# Patient Record
Sex: MALE | Race: BLACK OR AFRICAN AMERICAN | NOT HISPANIC OR LATINO | Employment: UNEMPLOYED | URBAN - METROPOLITAN AREA
[De-identification: names, ages, dates, MRNs, and addresses within clinical notes are randomized per-mention and may not be internally consistent; named-entity substitution may affect disease eponyms.]

---

## 2017-07-27 ENCOUNTER — HOSPITAL ENCOUNTER (EMERGENCY)
Facility: OTHER | Age: 27
Discharge: HOME | End: 2017-07-27
Attending: EMERGENCY MEDICINE

## 2017-07-27 VITALS
OXYGEN SATURATION: 99 % | HEIGHT: 69 IN | RESPIRATION RATE: 16 BRPM | DIASTOLIC BLOOD PRESSURE: 56 MMHG | WEIGHT: 130 LBS | SYSTOLIC BLOOD PRESSURE: 128 MMHG | TEMPERATURE: 99 F | BODY MASS INDEX: 19.26 KG/M2 | HEART RATE: 79 BPM

## 2017-07-27 DIAGNOSIS — M79.672 FOOT PAIN, BILATERAL: Primary | ICD-10-CM

## 2017-07-27 DIAGNOSIS — M79.671 FOOT PAIN, BILATERAL: Primary | ICD-10-CM

## 2017-07-27 DIAGNOSIS — M25.572 ACUTE BILATERAL ANKLE PAIN: ICD-10-CM

## 2017-07-27 DIAGNOSIS — M25.571 ACUTE BILATERAL ANKLE PAIN: ICD-10-CM

## 2017-07-27 PROCEDURE — 25000003 PHARM REV CODE 250: Performed by: PHYSICIAN ASSISTANT

## 2017-07-27 PROCEDURE — 99283 EMERGENCY DEPT VISIT LOW MDM: CPT

## 2017-07-27 RX ORDER — IBUPROFEN 800 MG/1
800 TABLET ORAL 3 TIMES DAILY
Qty: 21 TABLET | Refills: 0 | Status: SHIPPED | OUTPATIENT
Start: 2017-07-27 | End: 2017-08-03

## 2017-07-27 RX ORDER — IBUPROFEN 400 MG/1
800 TABLET ORAL
Status: COMPLETED | OUTPATIENT
Start: 2017-07-27 | End: 2017-07-27

## 2017-07-27 RX ADMIN — IBUPROFEN 800 MG: 400 TABLET, FILM COATED ORAL at 08:07

## 2017-07-28 NOTE — ED PROVIDER NOTES
Encounter Date: 7/27/2017       History     Chief Complaint   Patient presents with    Ankle Pain     pt c/o bilateral ankle pain and swelling to the R ankle after playing basketball a week ago     Patient is a 26-year-old male presenting to the emergency department with complaints of bilateral ankle and foot pain.  The patient reports his symptoms 7 persistent for over 2 weeks.  He states the pain initially started after he played basketball, and states that his been constant since.  He admits that they've worsened after he tried to play basketball again.  He denies any numbness, tingling, weakness.  He states that he has significant pain from the ankle into the feet.  He reports subjective swelling.  He states that he has used Epson soaks but has not taken any oral medication.  He denies previous injury or trauma.  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      The history is provided by the patient.     Review of patient's allergies indicates:  No Known Allergies  History reviewed. No pertinent past medical history.  History reviewed. No pertinent surgical history.  History reviewed. No pertinent family history.  Social History   Substance Use Topics    Smoking status: Light Tobacco Smoker    Smokeless tobacco: Never Used    Alcohol use Not on file     Review of Systems   Constitutional: Negative for activity change, chills, fatigue and fever.   HENT: Negative for congestion, rhinorrhea and sore throat.    Eyes: Negative for photophobia and visual disturbance.   Respiratory: Negative for cough and shortness of breath.    Cardiovascular: Negative for chest pain.   Gastrointestinal: Negative for abdominal pain, diarrhea, nausea and vomiting.   Genitourinary: Negative for dysuria, hematuria and urgency.   Musculoskeletal: Positive for arthralgias and myalgias. Negative for back pain and neck pain.   Skin: Negative for color change and wound.   Neurological: Negative  for weakness and headaches.   Psychiatric/Behavioral: Negative for agitation and confusion.       Physical Exam     Initial Vitals [07/27/17 1950]   BP Pulse Resp Temp SpO2   (!) 128/56 79 16 98.5 °F (36.9 °C) 99 %      MAP       80         Physical Exam    Nursing note and vitals reviewed.  Constitutional: Vital signs are normal. He appears well-developed and well-nourished. He is not diaphoretic.  Non-toxic appearance. He does not have a sickly appearance. No distress.   Well appearing, -American male accompanied by his girlfriend in the emergency department.  He speaking clear and full sentences.  He is in no acute distress.  He and rates that difficulty.   HENT:   Head: Normocephalic and atraumatic.   Right Ear: External ear normal.   Left Ear: External ear normal.   Nose: Nose normal.   Mouth/Throat: Oropharynx is clear and moist.   Eyes: Conjunctivae and EOM are normal.   Neck: Normal range of motion. Neck supple.   Cardiovascular: Normal rate, regular rhythm and normal heart sounds.   Pulmonary/Chest: Breath sounds normal. No respiratory distress. He has no wheezes.   Musculoskeletal: Normal range of motion.   Tenderness to palpation of the bilateral lower extremities at the bilateral malleolus, dorsum of the foot.  No edema noted.  Normal range of motion, strength, sensation, capillary refill, pulses.  No overlying skin changes.  Ambulatory and weightbearing.   Neurological: He is alert and oriented to person, place, and time. He has normal strength. No cranial nerve deficit or sensory deficit. GCS eye subscore is 4. GCS verbal subscore is 5. GCS motor subscore is 6.   Skin: Skin is warm.   Psychiatric: He has a normal mood and affect. His behavior is normal. Judgment and thought content normal.         ED Course   Procedures  Labs Reviewed - No data to display          Medical Decision Making:   Initial Assessment:   Urgent evaluation of a 26 y.o. male presenting to the emergency department  complaining of bilateral foot and ankle pain. Patient is afebrile, nontoxic appearing and hemodynamically stable. Physical exam reveals regular rate and rhythm, lungs are clear to auscultation bilaterally.  Diffuse tenderness to palpation of bilateral lower extremities with no focal deformity, crepitus, step-off.  Normal range of motion, strength, sensation.    ED Management:  Without specific history of recent injury or trauma, do not feel that x-rays are warranted.  Patient's signs and symptoms are not consistent with an acute fracture dislocation.  He is ambulatory and weightbearing.  I do believe his signs and symptoms are likely secondary to musculoskeletal etiology.  He'll be discharged home with a prescription for ibuprofen, counseled on symptomatic care and treatment.  He stable for discharge. The patient was instructed to follow up with a primary care provider in 2 days or to return to the emergency department for worsening symptoms. The treatment plan was discussed with the patient who demonstrated understanding and comfort with plan. The patient's history, physical exam, and plan of care was discussed with and agreed upon with my supervising physician.    Other:   I have discussed this case with another health care provider.       <> Summary of the Discussion: Dr. Howard  This note was created using Dragon Medical Dictation. There may be typographical errors secondary to dictation.                    ED Course     Clinical Impression:     1. Foot pain, bilateral    2. Acute bilateral ankle pain         Disposition:   Disposition: Discharged  Condition: Stable                        Uma Jarrett PA-C  07/27/17 4287

## 2022-01-26 ENCOUNTER — HOSPITAL ENCOUNTER (EMERGENCY)
Facility: HOSPITAL | Age: 32
Discharge: HOME | End: 2022-01-26
Attending: EMERGENCY MEDICINE
Payer: MEDICAID

## 2022-01-26 VITALS
DIASTOLIC BLOOD PRESSURE: 60 MMHG | RESPIRATION RATE: 18 BRPM | HEIGHT: 69 IN | SYSTOLIC BLOOD PRESSURE: 102 MMHG | HEART RATE: 92 BPM | OXYGEN SATURATION: 100 % | TEMPERATURE: 99 F | WEIGHT: 119.69 LBS | BODY MASS INDEX: 17.73 KG/M2

## 2022-01-26 DIAGNOSIS — F41.9 ANXIETY: Primary | ICD-10-CM

## 2022-01-26 PROCEDURE — 99283 EMERGENCY DEPT VISIT LOW MDM: CPT

## 2022-01-26 RX ORDER — HYDROXYZINE PAMOATE 25 MG/1
25 CAPSULE ORAL EVERY 8 HOURS PRN
Qty: 20 CAPSULE | Refills: 0 | Status: SHIPPED | OUTPATIENT
Start: 2022-01-26

## 2022-01-26 NOTE — ED PROVIDER NOTES
History      Chief Complaint   Patient presents with    Depression    Anxiety     No previous treatment; Personal problems       Review of patient's allergies indicates:  No Known Allergies     HPI   HPI    1/26/2022, 2:06 PM   History obtained from the patient      History of Present Illness: Denise Moreno is a 31 y.o. male patient who presents to the Emergency Department for anxiety and depression with difficulty sleeping for months.  He has a psychiatric appt 2/7. Denies SI/HI/AVH. Symptoms are moderate in severity.     No further complaints or concerns at this time.           PCP: Primary Doctor No       Past Medical History:  History reviewed. No pertinent past medical history.      Past Surgical History:  History reviewed. No pertinent surgical history.        Family History:  No family history on file.        Social History:  Social History     Tobacco Use    Smoking status: Light Tobacco Smoker     Types: Cigarettes    Smokeless tobacco: Never Used   Substance and Sexual Activity    Alcohol use: Yes    Drug use: No    Sexual activity: Yes     Partners: Female     Birth control/protection: None       ROS     Review of Systems   Constitutional: Negative for chills and fever.   HENT: Negative for facial swelling and trouble swallowing.    Eyes: Negative for pain and discharge.   Respiratory: Negative for chest tightness and shortness of breath.    Cardiovascular: Negative for palpitations and leg swelling.   Gastrointestinal: Negative for diarrhea and vomiting.   Endocrine: Negative for polydipsia and polyuria.   Genitourinary: Negative for decreased urine volume and flank pain.   Musculoskeletal: Negative for joint swelling and neck stiffness.   Skin: Negative for rash and wound.   Neurological: Negative for syncope and light-headedness.   Psychiatric/Behavioral: Positive for dysphoric mood and sleep disturbance. Negative for hallucinations, self-injury and suicidal ideas. The patient is  "nervous/anxious.    All other systems reviewed and are negative.      Physical Exam      Initial Vitals [01/26/22 1351]   BP Pulse Resp Temp SpO2   102/60 92 18 98.5 °F (36.9 °C) 100 %      MAP       --         Physical Exam  Vital signs and nursing notes reviewed.  Constitutional: Patient is in NAD. Awake and alert. Well-developed and well-nourished.  Head: Atraumatic. Normocephalic.  Eyes: PERRL. EOM intact. Conjunctivae nl. No scleral icterus.  ENT: Mucous membranes are moist. Oropharynx is clear.  Neck: Supple. No JVD. No lymphadenopathy.  No meningismus  Cardiovascular: Regular rate and rhythm. No murmurs, rubs, or gallops. Distal pulses are 2+ and symmetric.  Pulmonary/Chest: No respiratory distress. Clear to auscultation bilaterally. No wheezing, rales, or rhonchi.  Abdominal: Soft. Non-distended. No TTP. No rebound, guarding, or rigidity. Good bowel sounds.  Genitourinary: No CVA tenderness  Musculoskeletal: Moves all extremities. No edema.   Skin: Warm and dry.  Neurological: Awake and alert. No acute focal neurological deficits are appreciated.  Psychiatric: Normal affect. Good eye contact. Appropriate in content.      ED Course          Procedures  ED Vital Signs:  Vitals:    01/26/22 1351   BP: 102/60   Pulse: 92   Resp: 18   Temp: 98.5 °F (36.9 °C)   TempSrc: Oral   SpO2: 100%   Weight: 54.3 kg (119 lb 11.4 oz)   Height: 5' 9" (1.753 m)                 Imaging Results:  Imaging Results    None            The Emergency Provider reviewed the vital signs and test results, which are outlined above.    ED Discussion             Medication(s) given in the ER:  Medications - No data to display         Follow-up Information     [facility] In 2 days.    Contact information:  [address] [address] [phone]                                Medication List      START taking these medications    hydrOXYzine pamoate 25 MG Cap  Commonly known as: VISTARIL  Take 1 capsule (25 mg " total) by mouth every 8 (eight) hours as needed (anxiety).           Where to Get Your Medications      These medications were sent to [id number] - [place], [place] - [address] [facility]  [address] [address]    Phone: [phone]   · hydrOXYzine pamoate 25 MG Cap             Medical Decision Making        All findings were reviewed with the patient/family in detail.   All remaining questions and concerns were addressed at that time.  Patient/family has been counseled regarding the need for follow-up as well as the indication to return to the emergency room should new or worrisome developments occur.        MDM               Clinical Impression:        ICD-10-CM ICD-9-CM   1. Anxiety  F41.9 300.00               Christina Campos PA-C  01/26/22 1408

## 2022-05-23 ENCOUNTER — HOSPITAL ENCOUNTER (EMERGENCY)
Facility: HOSPITAL | Age: 32
Discharge: HOME | End: 2022-05-23
Attending: EMERGENCY MEDICINE
Payer: MEDICAID

## 2022-05-23 VITALS
WEIGHT: 130 LBS | RESPIRATION RATE: 16 BRPM | TEMPERATURE: 98 F | OXYGEN SATURATION: 100 % | DIASTOLIC BLOOD PRESSURE: 60 MMHG | SYSTOLIC BLOOD PRESSURE: 122 MMHG | HEART RATE: 70 BPM | BODY MASS INDEX: 19.2 KG/M2

## 2022-05-23 DIAGNOSIS — F22 PARANOIA: Primary | ICD-10-CM

## 2022-05-23 PROCEDURE — 99283 EMERGENCY DEPT VISIT LOW MDM: CPT

## 2022-05-23 PROCEDURE — 99284 EMERGENCY DEPT VISIT MOD MDM: CPT | Mod: ,,, | Performed by: EMERGENCY MEDICINE

## 2022-05-23 PROCEDURE — 99284 PR EMERGENCY DEPT VISIT,LEVEL IV: ICD-10-PCS | Mod: ,,, | Performed by: EMERGENCY MEDICINE

## 2022-05-23 NOTE — DISCHARGE INSTRUCTIONS
[facility] Outpatient Clinics  - [facility]: [address] [place], [phone]  - [facility]: [address] [place], [phone]  - [facility]: [address] [place], [phone]  - [facility]: [address] [place], [phone]  - [facility] ([facility]): [address], [phone]  - [facility] ([facility]): [address] [facility] [address] [place], [phone]  - [facility]): [phone]  - [facility] [phone]     [facility] Private Outpatient Clinics  - [facility]: [address] 4th floor, [phone]  - [facility]: [address] [place], [phone]  - [facility]: [address], [phone], [phone]  - [facility]: [address], [phone]      Crisis  - [facility] Hotline, [phone]  - National Suicide Prevention Crisis Hotline: 306.570.1256

## 2022-05-23 NOTE — ED NOTES
Patient identifiers verified and correct for Mr Moreno  C/C: Bizarre behavior SEE NN  APPEARANCE: awake and alert in NAD.  SKIN: warm, dry and intact. No breakdown or bruising.  MUSCULOSKELETAL: Patient moving all extremities spontaneously, no obvious swelling or deformities noted. Ambulates independently.  RESPIRATORY: Denies shortness of breath.Respirations unlabored.   CARDIAC: Denies CP, 2+ distal pulses; no peripheral edema  ABDOMEN: S/ND/NT, Denies nausea  : voids spontaneously, denies difficulty  Neurologic: AAO x 4; follows commands equal strength in all extremities; denies numbness/tingling. Denies dizziness  Denies weakness,

## 2022-05-23 NOTE — ED NOTES
"Patient states " delusions" and " insecurities" Family stateshe hears things that are not happening, has " trust issues" and accusing GF of being unfaithful. Reports hearing sounds on the phone 2 months ago, patient states he needs to " maintain my thoughts"  Denies recent Vistaril use, Denies suicidal or homicidal ideations  "

## 2022-05-23 NOTE — ED PROVIDER NOTES
"Source of History:  pt and girlfriend    Chief complaint:  Anxiety (States has had a lot of anxiety and "insecurities", was living in  and has a therapist out there but is now living in  and wants to get established out here and started back on anxiety meds pt denies SI and HI )      HPI:  Denise Moreno is a 31 y.o. male presenting with delusions and paranoia.  His girlfriend states that he is regularly concerned that she is cheating on him are otherwise trying to trick and manipulate him.  She notes examples of when she was on the phone with him and he thinks that she is performing sex acts on other people over the phone, or when she is talking with a  at their [employer] and he thinks that they are somehow communicating behind his back.  They both note that he had this problem with the previous girlfriend as well.  His girlfriend says that he is concerned that other people are trying to steal from him or that other people want things of his.  He denies feeling like anyone is out to get him or trying to harm him.  He denies feeling like it is better different than other people, denies wanting to harm anyone because of his paranoia, denies wanting to hurt himself.  He denies hearing voices or seeing things.  He does not feel depressed.  He used to see therapist, but this was in [place], and since he has moved to [place] he has not seen anyone.  His last visit was several months ago.  No history of any mental health disorders or mental health hospitalizations.    ROS: As per HPI and below:    General: No fever.  No chills.  Eyes: No visual changes.  Head: No headache.    Integument: No rashes or lesions.  Chest: No shortness of breath.  Cardiovascular: No chest pain.  Abdomen: No abdominal pain.  No nausea or vomiting.  Urinary: No abnormal urination.  Neurologic: No focal weakness.  No numbness.  Hematologic: No easy bruising.  Endocrine: No excessive thirst or urination.      Review of patient's " allergies indicates:  No Known Allergies    No current facility-administered medications on file prior to encounter.     Current Outpatient Medications on File Prior to Encounter   Medication Sig Dispense Refill    hydrOXYzine pamoate (VISTARIL) 25 MG Cap Take 1 capsule (25 mg total) by mouth every 8 (eight) hours as needed (anxiety). 20 capsule 0       PMH:  As per HPI and below:  History reviewed. No pertinent past medical history.  History reviewed. No pertinent surgical history.    Social History     Socioeconomic History    Marital status: Single   Tobacco Use    Smoking status: Light Tobacco Smoker    Smokeless tobacco: Never Used   Substance and Sexual Activity    Alcohol use: Yes     Comment: 2 beers daily    Drug use: No    Sexual activity: Yes     Partners: Female     Birth control/protection: None       History reviewed. No pertinent family history.    Physical Exam:    Vitals:    05/23/22 1233   BP: 122/60   Pulse: 70   Resp: 16   Temp: 98.4 °F (36.9 °C)     Appearance: No acute distress.  Skin: No rashes seen.  Good turgor.  No abrasions.  No ecchymoses.  Eyes: No conjunctival injection. EOMI, PERRL.  ENT: Oropharynx clear.    Chest:  No increased work of breathing, bilateral chest rise.  Cardiovascular: Regular rate and rhythm.  Normal equal bilateral radial pulses.  Abdomen: Soft.  Not distended.  Nontender.  No guarding.  No rebound. No Masses  Musculoskeletal: Good range of motion all joints.  No deformities.  Neck supple, full range of motion, no obvious deformity.  Neurologic: Moves all extremities.  Normal sensation.  No facial droop.  Normal speech.    Mental Status:  Alert and oriented x 3.  Appropriate, conversant.          Laboratory Studies:  Labs Reviewed - No data to display    I decided to obtain the old medical records.  Reviewed and summarized the old medical record and it showed patient previously taking hydroxyzine for anxiety    Imaging Results    None         Medications  Given:  Medications - No data to display    Discussed with:  Patient and girlfriend    MDM:    31 y.o. male with paranoia and some delusions.  He has no signs of mystical thinking, delusions of grandeur,or hallucinations.  I do not feel that he is gravely disabled, or threat to others or himself.  I do think he would benefit from outpatient psychiatric evaluation.  He was given a list of several clinics in [place], as well as an ambulatory referral to Psychiatry here.  Advised pt to follow up with PCP or return if concerning symptoms arise. Pt understands and agrees with plan. Will d/c home.          Diagnostic Impression:    1. Paranoia             Oscar Hector MD  05/23/22 9808

## 2022-05-24 ENCOUNTER — OFFICE VISIT (OUTPATIENT)
Dept: PSYCHIATRY | Facility: CLINIC | Age: 32
End: 2022-05-24
Payer: MEDICAID

## 2022-05-24 DIAGNOSIS — F22 PARANOIA: ICD-10-CM

## 2022-05-24 PROCEDURE — 90792 PSYCH DIAG EVAL W/MED SRVCS: CPT | Mod: SA,HB,95, | Performed by: PHYSICIAN ASSISTANT

## 2022-05-24 PROCEDURE — 1159F MED LIST DOCD IN RCRD: CPT | Mod: SA,HB,CPTII,95 | Performed by: PHYSICIAN ASSISTANT

## 2022-05-24 PROCEDURE — 1159F PR MEDICATION LIST DOCUMENTED IN MEDICAL RECORD: ICD-10-PCS | Mod: SA,HB,CPTII,95 | Performed by: PHYSICIAN ASSISTANT

## 2022-05-24 PROCEDURE — 90792 PR PSYCHIATRIC DIAGNOSTIC EVALUATION W/MEDICAL SERVICES: ICD-10-PCS | Mod: SA,HB,95, | Performed by: PHYSICIAN ASSISTANT

## 2022-05-24 PROCEDURE — 1160F RVW MEDS BY RX/DR IN RCRD: CPT | Mod: SA,HB,CPTII,95 | Performed by: PHYSICIAN ASSISTANT

## 2022-05-24 PROCEDURE — 1160F PR REVIEW ALL MEDS BY PRESCRIBER/CLIN PHARMACIST DOCUMENTED: ICD-10-PCS | Mod: SA,HB,CPTII,95 | Performed by: PHYSICIAN ASSISTANT

## 2022-05-24 RX ORDER — RISPERIDONE 0.5 MG/1
0.5 TABLET ORAL NIGHTLY
Qty: 30 TABLET | Refills: 0 | Status: SHIPPED | OUTPATIENT
Start: 2022-05-24 | End: 2022-06-28
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
## 2022-08-15 ENCOUNTER — OFFICE VISIT (OUTPATIENT)
Dept: PSYCHIATRY | Facility: CLINIC | Age: 32
End: 2022-08-15
Payer: MEDICAID

## 2022-08-15 DIAGNOSIS — F22 PARANOIA: Primary | ICD-10-CM

## 2022-08-15 PROCEDURE — 1159F PR MEDICATION LIST DOCUMENTED IN MEDICAL RECORD: ICD-10-PCS | Mod: SA,HB,CPTII,95 | Performed by: PHYSICIAN ASSISTANT

## 2022-08-15 PROCEDURE — 99214 OFFICE O/P EST MOD 30 MIN: CPT | Mod: SA,HB,95, | Performed by: PHYSICIAN ASSISTANT

## 2022-08-15 PROCEDURE — 1160F RVW MEDS BY RX/DR IN RCRD: CPT | Mod: SA,HB,CPTII,95 | Performed by: PHYSICIAN ASSISTANT

## 2022-08-15 PROCEDURE — 99214 PR OFFICE/OUTPT VISIT, EST, LEVL IV, 30-39 MIN: ICD-10-PCS | Mod: SA,HB,95, | Performed by: PHYSICIAN ASSISTANT

## 2022-08-15 PROCEDURE — 1159F MED LIST DOCD IN RCRD: CPT | Mod: SA,HB,CPTII,95 | Performed by: PHYSICIAN ASSISTANT

## 2022-08-15 PROCEDURE — 1160F PR REVIEW ALL MEDS BY PRESCRIBER/CLIN PHARMACIST DOCUMENTED: ICD-10-PCS | Mod: SA,HB,CPTII,95 | Performed by: PHYSICIAN ASSISTANT

## 2022-08-15 RX ORDER — RISPERIDONE 1 MG/1
1 TABLET ORAL 2 TIMES DAILY
Qty: 60 TABLET | Refills: 2 | Status: SHIPPED | OUTPATIENT
Start: 2022-08-15 | End: 2022-11-13

## 2022-08-15 NOTE — PROGRESS NOTES
"The patient location is: [place], [place]  The chief complaint leading to consultation is: follow up for paranoia    Visit type: audiovisual    Face to Face time with patient: 16  22 minutes of total time spent on the encounter, which includes face to face time and non-face to face time preparing to see the patient (eg, review of tests), Obtaining and/or reviewing separately obtained history, Documenting clinical information in the electronic or other health record, Independently interpreting results (not separately reported) and communicating results to the patient/family/caregiver, or Care coordination (not separately reported).         Each patient to whom he or she provides medical services by telemedicine is:  (1) informed of the relationship between the physician and patient and the respective role of any other health care provider with respect to management of the patient; and (2) notified that he or she may decline to receive medical services by telemedicine and may withdraw from such care at any time.    Notes:   Outpatient Psychiatry Follow-Up Visit (PA)    08/15/2022    Clinical Status of Patient:  Outpatient (Ambulatory)    Chief Complaint:  Denise Moreno is a 31 y.o. male who presents today for follow-up of paranoia.  Met with patient.     Current Medications:   risperdal 0.5 mg    Interval History and Content of Current Session:  Patient seen and chart reviewed. Last seen on 5/24/2022    Patient has a psychiatric history of: paranoia.     Pt presents for follow up today after starting risperdal 0.5 mg qhs. Patient reports that he had not been taking the medication regularly until recently. He reports he has been taking it daily for the last week or so and has since noticed the benefits from the medication. He reports that it "is changing my mind in a positive way." He reports that when he is not on the medication he is more prone to saying hurtful things to his wife, the medication helps him to " "control his temper.     He also reports a major decrease in his paranoia. He is no longer paranoid that his wife is unfaithful or that people are against him. He states that his accusations have "gone down at a rapid rate since starting the medications." He states "I'm beating that [paranoia] 75% now" He admits that he still has "moments where I feel like I can't trust people" but not like before.     He reports that his mood has been stabilized by the medication and he is no longer fluctuating as much.     His anxiety has improved, he is no longer as anxious and is no longer having anxiety attacks. He states "I don't think as much as I used to do."    Pt appears calm and is cooperative during interview.     He denies adverse effects from medication    Patient continues to state he stays up late. He reports some difficulty with sleep, stating "I drink 2-3 beers every night" and then smokes CBD to help sleep. He estimates getting 7 hours a night.    His appetite is good.     He denies SI/HI/AVH.       Psychotherapy:  · Target symptoms: psychosis, paranoia  · Why chosen therapy is appropriate versus another modality: relevant to diagnosis, evidence based practice  · Outcome monitoring methods: self-report  · Therapeutic intervention type: supportive psychotherapy  · Topics discussed/themes: relationships difficulties, building skills sets for symptom management, symptom recognition  · The patient's response to the intervention is accepting. The patient's progress toward treatment goals is good.   · Duration of intervention: 15 minutes.    Review of Systems   · PSYCHIATRIC: Pertinant items are noted in the narrative.    Past Medication Trials:    Past Medical, Family and Social History: The patient's past medical, family and social history have been reviewed and updated as appropriate within the electronic medical record - see encounter notes.    Compliance: no, compliance improving within the last week    Side " effects: None    Risk Parameters:  Patient reports no suicidal ideation  Patient reports no homicidal ideation  Patient reports no self-injurious behavior  Patient reports no violent behavior    Exam (detailed: at least 9 elements; comprehensive: all 15 elements)   Constitutional  Vitals:  Most recent vital signs, dated greater than 90 days prior to this appointment, were reviewed.   There were no vitals filed for this visit.     General:  unremarkable, age appropriate, casually dressed, neatly groomed     Musculoskeletal  Muscle Strength/Tone:  not examined   Gait & Station:  non-ataxic     Psychiatric  Speech:  no latency; no press   Mood & Affect:  steady  congruent and appropriate   Thought Process:  normal and logical   Associations:  intact   Thought Content:  normal, no suicidality, no homicidality, delusions, or paranoia   Insight:  intact   Judgement: behavior is adequate to circumstances   Orientation:  grossly intact   Memory: intact for content of interview   Language: grossly intact   Attention Span & Concentration:  able to focus   Fund of Knowledge:  intact and appropriate to age and level of education     Assessment and Diagnosis   Status/Progress: Based on the examination today, the patient's problem(s) is/are improved and adequately but not ideally controlled.  New problems have not been presented today.   Lack of compliance are complicating management of the primary condition.  There are no active rule-out diagnoses for this patient at this time.     General Impression: Patient is a 31 year old male with a psychiatric history of paranoia. Patient has benefited from risperdal 0.5 mg when taking consistently, would like to increase to 1 mg.     Patient is stable at this time, denies psychosis.       ICD-10-CM ICD-9-CM    1. Paranoia  F22 297.1 CBC W/ AUTO DIFFERENTIAL      COMPREHENSIVE METABOLIC PANEL       Intervention/Counseling/Treatment Plan   · Medication Management: Continue current  medications. The risks and benefits of medication were discussed with the patient.   · Increase to risperdal 1 mg nightly  · I discussed with the patient the risks of Extrapyramidal Side Effects (dystonia, akathisia, parkinsonism), Metabolic syndrome (inlcuding Hyperglycemia, hyperlipidemia), Tardive Dyskinesia with antipsychotic use.  · Discussed diagnosis, risk and benefits of proposed treatment above vs alternative treatment vs no treatment, and potential side effects of these treatments, and the inherent unpredictability of individual responses to these treatments. The patient expresses understanding and gives informed consent to pursue treatment at this time, believing that the potential benefits outweigh the potential risks. Patient has no other questions. Patient voices understanding and agreement with this plan  · CBC, CMP ordered  · Encouraged patient to keep future appointments  · Instruct patient to call or message with questions  · In the event of an emergency, including suicidal ideation, patient was advised to go to the emergency room      Return to Clinic: 1 month, 2 months    Debbie Vasques PA-C